# Patient Record
Sex: MALE | Employment: FULL TIME | URBAN - METROPOLITAN AREA
[De-identification: names, ages, dates, MRNs, and addresses within clinical notes are randomized per-mention and may not be internally consistent; named-entity substitution may affect disease eponyms.]

---

## 2023-08-24 ENCOUNTER — OFFICE VISIT (OUTPATIENT)
Dept: PRIMARY CARE | Facility: CLINIC | Age: 33
End: 2023-08-24
Payer: COMMERCIAL

## 2023-08-24 VITALS
HEIGHT: 73 IN | TEMPERATURE: 97.4 F | WEIGHT: 292 LBS | OXYGEN SATURATION: 95 % | DIASTOLIC BLOOD PRESSURE: 81 MMHG | HEART RATE: 97 BPM | SYSTOLIC BLOOD PRESSURE: 142 MMHG | BODY MASS INDEX: 38.7 KG/M2

## 2023-08-24 DIAGNOSIS — R00.2 PALPITATIONS: ICD-10-CM

## 2023-08-24 DIAGNOSIS — Z00.00 ENCOUNTER FOR MEDICAL EXAMINATION TO ESTABLISH CARE: Primary | ICD-10-CM

## 2023-08-24 DIAGNOSIS — G44.209 TENSION HEADACHE: ICD-10-CM

## 2023-08-24 DIAGNOSIS — Z00.00 HEALTHCARE MAINTENANCE: ICD-10-CM

## 2023-08-24 DIAGNOSIS — R42 LIGHTHEADEDNESS: ICD-10-CM

## 2023-08-24 DIAGNOSIS — F51.01 PRIMARY INSOMNIA: ICD-10-CM

## 2023-08-24 PROCEDURE — 1036F TOBACCO NON-USER: CPT | Performed by: STUDENT IN AN ORGANIZED HEALTH CARE EDUCATION/TRAINING PROGRAM

## 2023-08-24 PROCEDURE — 99204 OFFICE O/P NEW MOD 45 MIN: CPT | Performed by: STUDENT IN AN ORGANIZED HEALTH CARE EDUCATION/TRAINING PROGRAM

## 2023-08-24 PROCEDURE — 93000 ELECTROCARDIOGRAM COMPLETE: CPT | Performed by: STUDENT IN AN ORGANIZED HEALTH CARE EDUCATION/TRAINING PROGRAM

## 2023-08-24 RX ORDER — OMEPRAZOLE 20 MG/1
CAPSULE, DELAYED RELEASE ORAL
COMMUNITY

## 2023-08-24 ASSESSMENT — ENCOUNTER SYMPTOMS
DIZZINESS: 0
ARTHRALGIAS: 0
SHORTNESS OF BREATH: 0
RHINORRHEA: 0
COUGH: 0
FATIGUE: 0
HEADACHES: 1
CONSTIPATION: 0
ABDOMINAL PAIN: 0
LIGHT-HEADEDNESS: 1
PALPITATIONS: 1
FREQUENCY: 0
FEVER: 0
NAUSEA: 0
DIARRHEA: 0
CHILLS: 0
VOMITING: 0
MYALGIAS: 0

## 2023-08-24 NOTE — PATIENT INSTRUCTIONS
Thank you for coming in getting established care with us here today.    We did an EKG which was within the normal range and did not show any acute findings.  As discussed, we can do additional heart testing in the future if we need to.    Lab work orders have been placed.  There is a lab downstairs.  Hours are about 6:30 AM to about 4:30 PM during the week and about 8 AM to 11:30 AM on Saturdays.  They are not open on Sundays.  Please fast for about 10 hours before getting lab work.  You do not need an appointment but they will ask you your name so that they can get you checked in for the labs that we have ordered.    Please take into account some of the things we talked about for getting better sleep including regular wake and going to bed times, light exposure in the morning, breakfast with protein and fiber, timing caffeine between an hour to hour and a half after you wake up, exercise and cold showers.  We talked about nighttime routine including dimming lights and not having as much overhead light.  As well as bluelight blocking for devices.    I believe some of what you are having is more lightheadedness as opposed to vertigo.  We will try to put out vertigo exercises the next time that you have an appointment.  Please try to stay well-hydrated during the day.    We will follow-up with you in about a couple weeks to about a month for a wellness examination.  We can try to set that up before you leave today.  Please call if you need to with your schedule but please tell them it is for a wellness examination specifically.    Please call with any other questions or concerns.    Thank you

## 2023-08-24 NOTE — PROGRESS NOTES
Subjective   Patient ID: Maldonado Delacruz is a 32 y.o. male who presents for Establish Care (Vertigo /Trouble sleeping /Rapid heart rate /Tension headaches ).    HPI     He is here to establish care.  He has not been following with a doctor for the past 10 or more years.  He has been feeling random bouts of what he thinks could potentially be vertigo during the day.  He feels like the episodes are kind of like he is shutting down and feels really tired.  He is also not sure if these are more similar to something like lightheadedness.  When he is trying to rest or go to sleep, it feels like his heart is pounding more than normal.  He usually only notices this when he is sitting down and resting and not usually during the day while he is working.  He has been having trouble sleeping as well.  He has tried some melatonin Gummies but they do not seem to really be helping.  He has been getting increased tension headaches as well most days during the week.    He states that he had been drinking more energy drinks but then stopped about a month ago. He was having about 1-2 of them a day.  He joined a gym around that time and has been noticing symptoms since that time.  He has been drinking more water.  He is working more of a sitting down job and is not as active.  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  His work environment is very bright and he is not sure if this is also affecting his eyes were influencing his headaches.  He also is usually bent forward for most of the day and is not sure if this is also helping his headaches.  He has been getting about 3-4 hours of sleep a night if he is paz.  He works 6:00am-2:30pm. Sometimes he works weekends if they take overtime.  He is not sure how much his lack of sleep is playing a role with most of his symptoms.  He denies taking any other medications except for some over-the-counter Prilosec for gastric reflux.      Review of Systems   Constitutional:  Negative for  "chills, fatigue and fever.   HENT:  Negative for congestion, postnasal drip and rhinorrhea.    Respiratory:  Negative for cough and shortness of breath.    Cardiovascular:  Positive for palpitations. Negative for chest pain.   Gastrointestinal:  Negative for abdominal pain, constipation, diarrhea, nausea and vomiting.   Genitourinary:  Negative for decreased urine volume and frequency.   Musculoskeletal:  Negative for arthralgias and myalgias.   Skin:  Negative for rash.   Neurological:  Positive for light-headedness and headaches. Negative for dizziness.       Objective   /81   Pulse 97   Temp 36.3 °C (97.4 °F)   Ht 1.854 m (6' 1\")   Wt 132 kg (292 lb)   SpO2 95%   BMI 38.52 kg/m²     Physical Exam  Vitals and nursing note reviewed.   Constitutional:       General: He is not in acute distress.     Appearance: Normal appearance. He is obese. He is not ill-appearing or toxic-appearing.   HENT:      Head: Normocephalic and atraumatic.   Cardiovascular:      Rate and Rhythm: Normal rate and regular rhythm.      Heart sounds: Normal heart sounds.   Pulmonary:      Effort: Pulmonary effort is normal.      Breath sounds: Normal breath sounds.   Abdominal:      General: Bowel sounds are normal.      Palpations: Abdomen is soft.   Skin:     General: Skin is warm and dry.   Neurological:      Mental Status: He is alert.         Assessment/Plan   Problem List Items Addressed This Visit    None  Visit Diagnoses       Encounter for medical examination to establish care    -  Primary    Palpitations        Relevant Orders    ECG 12 lead (Clinic Performed) (Completed)    Comprehensive Metabolic Panel    Thyroid Stimulating Hormone    Thyroxine, Free    Magnesium    Primary insomnia        Tension headache        Lightheadedness        Relevant Orders    ECG 12 lead (Clinic Performed) (Completed)    Healthcare maintenance        Relevant Orders    CBC and Auto Differential    Comprehensive Metabolic Panel    Lipid " Panel    Urinalysis with Reflex Microscopic    Vitamin D, Total    Thyroid Stimulating Hormone    Thyroxine, Free          History and physical examination as above.  EKG performed in the office demonstrating normal sinus rhythm as discussed above.  No abnormal findings currently but no EKGs present for comparison.  Discussed patient's other symptoms as many of them possibly being linked to lack of sleep as well as issues with posterior and normal daily activities while he is at work.  Recommended healthy diet and exercise.  Discussed elements of good sleep hygiene.  Discussed differences between lightheadedness and vertigo.  We will try to provide resources at next appointment as unable to print them out currently.  Blood work ordered for screening purposes.  We will contact patient with results and we will plan on follow-up appointment within the next month for wellness examination.  He will come in sooner for acute concerns or complaints.

## 2026-02-17 ENCOUNTER — APPOINTMENT (OUTPATIENT)
Dept: NEUROLOGY | Facility: CLINIC | Age: 36
End: 2026-02-17
Payer: COMMERCIAL